# Patient Record
Sex: FEMALE | Race: WHITE
[De-identification: names, ages, dates, MRNs, and addresses within clinical notes are randomized per-mention and may not be internally consistent; named-entity substitution may affect disease eponyms.]

---

## 2017-09-13 ENCOUNTER — HOSPITAL ENCOUNTER (EMERGENCY)
Dept: HOSPITAL 17 - PHEFT | Age: 77
Discharge: HOME | End: 2017-09-13
Payer: MEDICARE

## 2017-09-13 VITALS — HEIGHT: 60 IN | BODY MASS INDEX: 25.1 KG/M2 | WEIGHT: 127.87 LBS

## 2017-09-13 VITALS
SYSTOLIC BLOOD PRESSURE: 140 MMHG | DIASTOLIC BLOOD PRESSURE: 68 MMHG | RESPIRATION RATE: 18 BRPM | TEMPERATURE: 98.3 F | HEART RATE: 64 BPM

## 2017-09-13 DIAGNOSIS — Z79.82: ICD-10-CM

## 2017-09-13 DIAGNOSIS — Z86.79: ICD-10-CM

## 2017-09-13 DIAGNOSIS — S20.211A: Primary | ICD-10-CM
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
## 2017-09-13 DIAGNOSIS — Z86.69: ICD-10-CM

## 2017-09-13 PROCEDURE — 99283 EMERGENCY DEPT VISIT LOW MDM: CPT

## 2017-09-13 PROCEDURE — 71101 X-RAY EXAM UNILAT RIBS/CHEST: CPT

## 2017-09-13 NOTE — PD
HPI


Chief Complaint:  Fall


Time Seen by Provider:  20:18


Travel History


International Travel<30 days:  No


Contact w/Intl Traveler<30days:  No


Traveled to known affect area:  No





History of Present Illness


HPI


77 year-old female presents to the emergency room for evaluation of right 

posterior rib pain after trip and fall just prior to arrival.  Patient tripped 

on a step and fell backwards striking her back on the concrete.  She denies 

hitting her loss of consciousness.  No other injuries.  Pain occurs when she 

touches her ribs.  She has not taken anything for pain.  No significant 

worsening of pain with deep breathing.  No difficulty breathing.  She denies 

hematuria.  No chronic medical conditions other than essential tremors.





PFSH


Past Medical History


Hx Anticoagulant Therapy:  Yes (asa)


Cardiovascular Problems:  Yes


Diminished Hearing:  No


Neurologic:  Yes (ESSENTIAL TREMORS)


Immunizations Current:  Yes


Tetanus Vaccination:  < 5 Years


Influenza Vaccination:  No


Pregnant?:  Not Pregnant


Menopausal:  Yes





Past Surgical History


 Section:  Yes


Hysterectomy:  Yes


Joint Replacement:  Yes (KNEE REPLACEMENT)





Social History


Alcohol Use:  No


Tobacco Use:  No


Substance Use:  No





Allergies-Medications


(Allergen,Severity, Reaction):  


Coded Allergies:  


     cephalexin (Unverified  Allergy, Severe, Rash, 17)


     ciprofloxacin (Unverified  Allergy, Severe, 17)


     furosemide (Unverified  Allergy, Severe, Rash, 17)


     primidone (Unverified  Allergy, Severe, HYPOTENSION, 17)


     Sulfa (Sulfonamide Antibiotics) (Unverified  Allergy, Unknown, 17)


     atenolol (Unverified  Allergy, Unknown, 17)


     hydroxyzine (Unverified  Adverse Reaction, Severe, 17)


 BEHAVIORAL/ANXIOUS


Reported Meds & Prescriptions





Reported Meds & Active Scripts


Active


Reported


Premarin (Estrogens, Conjugated) 0.45 Mg Tab 0.45 Mg PO DAILY


Xanax (Alprazolam) 0.5 Mg Tab 0.5 Mg PO BID PRN








Review of Systems


Except as stated in HPI:  all other systems reviewed are Neg





Physical Exam


Narrative


GENERAL: Well-nourished, well-developed female in no acute distress.  Afebrile.

  Ambulatory.


SKIN: Focused skin assessment warm/dry.  No erythema or ecchymosis noted.


HEAD: Normocephalic.


EYES: No scleral icterus. No injection or drainage. 


NECK: Supple, trachea midline. No JVD or lymphadenopathy.


CARDIOVASCULAR: Regular rate and rhythm without murmurs, gallops, or rubs. 


RESPIRATORY: Breath sounds equal bilaterally. No accessory muscle use.


CHEST: Mild tenderness to palpation of the right floating posterior rib.  

Without deformity or crepitance.  No retractions or use of accessory muscles.


BACK: Nontender without obvious deformity. No CVA tenderness.





Data


Data


Last Documented VS





Vital Signs








  Date Time  Temp Pulse Resp B/P (MAP) Pulse Ox O2 Delivery O2 Flow Rate FiO2


 


17 19:52     97 Room Air  


 


17 19:29 98.3 64 18 140/68 (92)    








Orders





 Orders


Ribs, Uni (W/Exp Cxr-Min 3vw) (17 )








[facility]


Medical Decision Making


Medical Screen Exam Complete:  Yes


Emergency Medical Condition:  Yes


Medical Record Reviewed:  Yes


Differential Diagnosis


Rib contusion, fracture, spasm, abrasion


Narrative Course


77-year-old female presents to the emergency room for evaluation of right 

posterior rib pain after trip and fall earlier today.  She landed directly on 

her right back.  Pain is localized laterally and there is point tenderness to 

the rib.  Patient denies hitting her loss of consciousness.  No other injuries.

  No increased work of breathing.  Vital signs stable.  97% on room air.  Lungs 

sounds clear and equal bilaterally.  No CVA tenderness.  No hematuria. X-ray is 

negative.  Patient encouraged to take Tylenol for pain and follow up with a 

primary care physician or return for worsening symptoms.  Encouraged to take 

deep breaths to prevent pneumonia.  She understands and agrees to plan.





Diagnosis





 Primary Impression:  


 Contusion of rib on right side


 Qualified Codes:  S20.211A - Contusion of right front wall of thorax, initial 

encounter


Referrals:  


Primary Care Physician





***Additional Instructions:  


Rest and drink plenty of fluids.


Take deep breaths to prevent pneumonia.


Take Tylenol food as directed, as needed for pain.


Apply ice to the affected area for 20 minutes at a time, as needed for pain and 

swelling.


Follow-up with a primary care physician.


Return to the emergency room for worsening symptoms.


Disposition:  01 DISCHARGE HOME


Condition:  Stable











Elzbieta Rick Sep 13, 2017 20:32

## 2017-09-13 NOTE — RADRPT
EXAM DATE/TIME:  09/13/2017 20:44 

 

[facility] COMPARISON:     

No previous studies available for comparison.

 

                     

INDICATIONS :     

Right lower rib pain post fall. 

                     

 

MEDICAL HISTORY :     

None.          

 

SURGICAL HISTORY :     

None.   

 

ENCOUNTER:     

Initial                                        

 

ACUITY:     

1 day      

 

PAIN SCORE:     

8/10

 

LOCATION:     

Right lower chest 

 

FINDINGS:     

Multiple views of the right ribs were performed.  There is no evidence of displaced fracture.  No jody
tructive lesions or areas of periosteal thickening are seen.  Expiratory view of the chest is negativ
e for pneumothorax.  The mediastinal structures are midline.  

 

CONCLUSION:     

No acute cardiopulmonary disease demonstrated. No perceptible rib fracture.

 

 

 

 Andrey Paz MD on September 13, 2017 at 21:01           

Board Certified Radiologist.

 This report was verified electronically.

## 2018-05-01 ENCOUNTER — HOSPITAL ENCOUNTER (EMERGENCY)
Dept: HOSPITAL 17 - PHEFT | Age: 78
Discharge: HOME | End: 2018-05-01
Payer: MEDICARE

## 2018-05-01 VITALS — BODY MASS INDEX: 25.8 KG/M2 | WEIGHT: 131.4 LBS | HEIGHT: 60 IN

## 2018-05-01 VITALS
SYSTOLIC BLOOD PRESSURE: 136 MMHG | RESPIRATION RATE: 18 BRPM | TEMPERATURE: 98.8 F | DIASTOLIC BLOOD PRESSURE: 80 MMHG | HEART RATE: 96 BPM | OXYGEN SATURATION: 98 %

## 2018-05-01 DIAGNOSIS — Z79.899: ICD-10-CM

## 2018-05-01 DIAGNOSIS — Z79.82: ICD-10-CM

## 2018-05-01 DIAGNOSIS — M19.042: ICD-10-CM

## 2018-05-01 DIAGNOSIS — Z88.2: ICD-10-CM

## 2018-05-01 DIAGNOSIS — S61.452A: Primary | ICD-10-CM
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
## 2018-05-01 DIAGNOSIS — Z88.8: ICD-10-CM

## 2018-05-01 DIAGNOSIS — S61.451A: ICD-10-CM

## 2018-05-01 DIAGNOSIS — M19.041: ICD-10-CM

## 2018-05-01 PROCEDURE — 99283 EMERGENCY DEPT VISIT LOW MDM: CPT

## 2018-05-01 PROCEDURE — 73130 X-RAY EXAM OF HAND: CPT

## 2018-05-01 NOTE — PD
HPI


Chief Complaint:  Bite or Sting


Time Seen by Provider:  17:32


Travel History


International Travel<30 days:  No


Contact w/Intl Traveler<30days:  No


Traveled to known affect area:  No





History of Present Illness


HPI


Patient comes to the emergency department complaining of multiple cat bites 

bilateral hands that occurred this morning.  Patient states she was tried 

putting flea powder on her cat and the cat did not like it and started biting 

and scratching her.  Patient believes her tetanus shot is up-to-date but is 

uncertain.  Patient reports calling 911 was checked out by EMS who recommended 

she come to the hospital she started getting redness or pain with it.  Patient 

complaining of soreness over left thumb and right index finger around bites 

without radiation.  Patient reports rinsing off wounds with water and putting 

antibiotic ointment on 1 of the lesions.  Reports pain is worse to palpation 

with certain movement.  Not touching improves the pain.  Chart was reviewed 

shows patient  had tetanus shot in .  Patient reports she is able to take 

penicillin.  Patient is requesting a dose of her home medication of Xanax as 

she states she forgot to take this.  Patient reports cat's vaccinations are up-

to-date.





PFSH


Past Medical History


Hx Anticoagulant Therapy:  Yes (asa)


Cardiovascular Problems:  Yes


Diminished Hearing:  No


Neurologic:  Yes (ESSENTIAL TREMORS)


Immunizations Current:  Yes


Menopausal:  Yes





Past Surgical History


 Section:  Yes


Hysterectomy:  Yes


Joint Replacement:  Yes (KNEE REPLACEMENT)





Social History


Alcohol Use:  No


Tobacco Use:  No


Substance Use:  No





Allergies-Medications


(Allergen,Severity, Reaction):  


Coded Allergies:  


     cephalexin (Unverified  Allergy, Severe, Rash, 18)


     ciprofloxacin (Unverified  Allergy, Severe, 18)


     furosemide (Unverified  Allergy, Severe, Rash, 18)


     primidone (Unverified  Allergy, Severe, HYPOTENSION, 18)


     tramadol (Verified  Allergy, Severe, Twitching, 18)


     Sulfa (Sulfonamide Antibiotics) (Unverified  Allergy, Unknown, 18)


     atenolol (Unverified  Allergy, Unknown, 18)


     hydroxyzine (Unverified  Adverse Reaction, Severe, 18)


 BEHAVIORAL/ANXIOUS


Reported Meds & Prescriptions





Reported Meds & Active Scripts


Active


Augmentin (Amoxicillin-Clavulanate) 875-125 Mg Tab 1 Tab PO BID 10 Days


Reported


Hyoscyamine (Hyoscyamine Sulfate) 0.125 Mg Tab 0.125 Mg PO Q4H


Aspirin Children's (Aspirin) 81 Mg Chew 81 Mg CHEW DAILY


Vitamin D-1000 (Cholecalciferol) 1,000 Unit Tab 2,000 Units PO DAILY


Multiple Vitamin 1 Tab 1 Tab PO DAILY


Atelvia (Risedronate) 35 Mg Tabdr 35 Mg PO Q7D


Premarin (Estrogens, Conjugated) 0.45 Mg Tab 0.45 Mg PO DAILY


Xanax (Alprazolam) 0.5 Mg Tab 0.5 Mg PO BID PRN








Review of Systems


Except as stated in HPI:  all other systems reviewed are Neg





Physical Exam


Narrative


GENERAL: Well-developed, well nourished, in no acute distress, and non-ill 

appearing.


SKIN: Multiple superficial bite and scratch marks noted bilateral hands with 

moderate tenderness noted proximal phalanx right index finger proximal phalanx 

left thumb.  No crepitus or foreign body noted.  Patient has a scratch noted 

over the right anterior lower leg.  No crepitus or foreign body noted.  

Neurovascularly intact distally.


HEAD: Atraumatic. Normocephalic. 


EYES: Pupils equal and round. EOMI. No scleral icterus. No injection or 

drainage. 


ENT: No nasal bleeding or discharge.  Mucous membranes pink and moist.


NECK: Trachea midline. Supple.  No nuclear rigidity.


RESPIRATORY: No accessory muscle use.  No respiratory distress. 


MUSCULOSKELETAL: No obvious deformities. No clubbing.  No cyanosis.  No edema.  

Full range of motion.


NEUROLOGICAL: Awake and alert. No obvious cranial nerve deficits.  Motor 

grossly within normal limits. Normal speech.


PSYCHIATRIC: Appropriate mood and affect; insight and judgment normal.





Data


Data


Last Documented VS





Vital Signs








  Date Time  Temp Pulse Resp B/P (MAP) Pulse Ox O2 Delivery O2 Flow Rate FiO2


 


18 17:27 98.8 96 18 136/80 (98) 98   








Orders





 Orders


Hand, Complete (Twh2oqi) (18 )


Hand, Complete (Duz1arg) (18 )


Alprazolam (Xanax) (18 18:00)


Amoxicil-Clavulanate (Augmentin) (18 18:00)


Ed Discharge Order (18 18:58)








MDM


Medical Decision Making


Medical Screen Exam Complete:  Yes


Emergency Medical Condition:  Yes


Interpretation(s)





Last Impressions








Hand X-Ray 18 0000 Signed





Impressions: 





 Service Date/Time:  Tuesday, May 1, 2018 18:09 - CONCLUSION:  1. No acute bony 





 abnormality. Osteoarthritis of the left hand.     Melchor Kunz MD 


 


Hand X-Ray 18 0000 Signed





Impressions: 





 Service Date/Time:  Tuesday, May 1, 2018 18:09 - CONCLUSION:  1. No acute 





 findings. Osteoarthritis of the right hand.     Melchor Kunz MD 








Differential Diagnosis


Bite, cat scratch, wound infection, foreign body


Narrative Course


The patient suffered animal bite wound. The animal is domesticated, 

vaccinations are reported to be UTD and the animal can be watched. There is no 

evidence of deep tissue involvement and/or local tendon involvement. There was 

no evidence to suggest foreign bodies. Visual and tactile exams were 

unremarkable. There was no evidence of neurovascular injury as well. The 

patients wounds were irrigated copiously, cleaned and dressed. Rabies 

prophylaxis was discussed with the patient and exposure appears low risk and 

not indicated. The patient was given signs and symptom warnings for infection, 

such as increasing pain, pain with movement of involved extremity,redness, 

swelling, associated heat, pus or fever. The patient was given antibiotics to 

cover mouth bridger and instructions for timely follow up for wound recheck. The 

patient agreed with plan of care. Animal control was contacted per hospital 

protocol.





X-ray was performed and there was no foreign body or tooth/tooth fragment.





Patient in no obvious distress upon re-evaluation. All pertinent Radiology 

result(s) discussed with patient. Patient was asked if they wanted to speak to 

my attending, which the patient did not wish to do at this time.  Any questions/

concerns in reference to patient diagnosis/condition discussed and clarified 

prior to patient's discharge. Reinforced sheer importance of close follow up 

with patient's primary physician or primary care clinic and/or hand surgeon. 

Instructed patient to return to ED immediately, if symptoms return/worsen. 

Patient showed understanding of above instructions.  Further instructions and 

recommendations were detailed in discharge paperwork.  Patient ambulated 

without difficulty out of ED at discharge.





Diagnosis





 Primary Impression:  


 Cat bite of hand


 Qualified Codes:  S61.459A - Open bite of unspecified hand, initial encounter; 

W55.01XA - Bitten by cat, initial encounter


Referrals:  


Rober Hollis III, MD


Patient Instructions:  Animal Bite (ED), General Instructions





***Additional Instructions:  


Follow-up with your primary care physician and/or hand surgeon in 3-5 days for 

reevaluation.  Take all medication as prescribed.  Keep wound dry and clean as 

possible using soap and water.  Use Neosporin to promote healing.  Return to 

the emergency department if symptoms get worse.


***Med/Other Pt SpecificInfo:  Prescription(s) given


Scripts


Amoxicillin-Clavulanate (Augmentin) 875-125 Mg Tab


1 TAB PO BID for Infection for 10 Days, #20 TAB 0 Refills


   Prov: Teresa Pretty MD         18


Disposition:  01 DISCHARGE HOME


Condition:  Stable











Andrzej Fry May 1, 2018 17:37

## 2018-05-01 NOTE — RADRPT
EXAM DATE/TIME:  05/01/2018 18:09 

 

[facility] COMPARISON:     

No previous studies available for comparison.

 

                     

INDICATIONS :     

Left hand wounds and swelling.  Cat attacked patient this morning.  Cat bites all over hand.

                     

 

MEDICAL HISTORY :     

None.          

 

SURGICAL HISTORY :     

None.   

 

ENCOUNTER:     

Initial                                        

 

ACUITY:     

1 day      

 

PAIN SCORE:     

8/10

 

LOCATION:     

Left  hand.

 

FINDINGS:     

Three view examination of the left hand demonstrates no soft tissue swelling, dislocation, or fractur
e.   The carpal bones appear intact.  The interphalangeal and metacarpophalangeal joints are intact. 
 Bony mineralization is normal.

 

CONCLUSION:     

1. No acute bony abnormality. Osteoarthritis of the left hand.

 

 

 

 Melchor Kunz MD on May 01, 2018 at 18:53           

Board Certified Radiologist.

 This report was verified electronically.

## 2018-05-01 NOTE — RADRPT
EXAM DATE/TIME:  05/01/2018 18:09 

 

[facility] COMPARISON:     

HAND RIGHT COMPLETE ([id number]), June 16, 2015, 9:54.

 

                     

INDICATIONS :     

Right hand wounds and swelling.  Cat attacked patient this morning.  Cat bites all over hand.

                     

 

MEDICAL HISTORY :     

None.          

 

SURGICAL HISTORY :     

None.   

 

ENCOUNTER:     

Initial                                        

 

ACUITY:     

1 day      

 

PAIN SCORE:     

8/10

 

LOCATION:     

Right  hand.

 

FINDINGS:     

Three view examination of the right hand demonstrates no soft tissue swelling, dislocation, or fractu
re.   The carpal bones appear intact.  The interphalangeal and metacarpophalangeal joints are intact.
  Bony mineralization is normal.

 

CONCLUSION:     

1. No acute findings. Osteoarthritis of the right hand.

 

 

 

 Melchor Kunz MD on May 01, 2018 at 18:51           

Board Certified Radiologist.

 This report was verified electronically.